# Patient Record
Sex: MALE | Race: BLACK OR AFRICAN AMERICAN | NOT HISPANIC OR LATINO | Employment: UNEMPLOYED | URBAN - METROPOLITAN AREA
[De-identification: names, ages, dates, MRNs, and addresses within clinical notes are randomized per-mention and may not be internally consistent; named-entity substitution may affect disease eponyms.]

---

## 2023-02-24 PROBLEM — H52.4 PRESBYOPIA: Status: ACTIVE | Noted: 2023-02-24

## 2023-02-24 PROBLEM — K62.5 RECTAL BLEEDING: Status: ACTIVE | Noted: 2023-02-24

## 2023-02-24 PROBLEM — R35.89 POLYURIA: Status: ACTIVE | Noted: 2023-02-24

## 2023-02-24 PROBLEM — H54.7 VISION LOSS: Status: ACTIVE | Noted: 2023-02-24

## 2023-02-24 PROBLEM — R29.6 RECURRENT FALLS: Status: ACTIVE | Noted: 2023-02-24

## 2023-02-24 PROBLEM — F10.10 ALCOHOL ABUSE: Status: ACTIVE | Noted: 2023-02-24

## 2023-02-24 PROBLEM — M75.80 ROTATOR CUFF TENDONITIS: Status: ACTIVE | Noted: 2023-02-24

## 2023-02-24 PROBLEM — I10 BENIGN ESSENTIAL HTN: Status: ACTIVE | Noted: 2023-02-24

## 2023-02-24 PROBLEM — Q84.5 ENLARGED AND HYPERTROPHIC NAILS: Status: ACTIVE | Noted: 2023-02-24

## 2023-02-24 PROBLEM — R74.01 TRANSAMINITIS: Status: ACTIVE | Noted: 2023-02-24

## 2023-02-24 PROBLEM — F10.20 ALCOHOLISM (MULTI): Status: ACTIVE | Noted: 2023-02-24

## 2023-02-24 PROBLEM — F12.90 MARIJUANA SMOKER: Status: ACTIVE | Noted: 2023-02-24

## 2023-02-24 PROBLEM — R68.89 COLD INTOLERANCE: Status: ACTIVE | Noted: 2023-02-24

## 2023-02-24 PROBLEM — M25.551 RIGHT HIP PAIN: Status: ACTIVE | Noted: 2023-02-24

## 2023-02-24 PROBLEM — M25.311 ROTATOR CUFF INSUFFICIENCY OF RIGHT SHOULDER: Status: ACTIVE | Noted: 2023-02-24

## 2023-02-24 PROBLEM — E55.9 VITAMIN D DEFICIENCY: Status: ACTIVE | Noted: 2023-02-24

## 2023-02-24 PROBLEM — F14.90 CRACK COCAINE USE: Status: ACTIVE | Noted: 2023-02-24

## 2023-02-24 RX ORDER — AMLODIPINE BESYLATE 5 MG/1
1 TABLET ORAL DAILY
COMMUNITY
Start: 2020-03-17 | End: 2023-07-10 | Stop reason: SDUPTHER

## 2023-02-24 RX ORDER — NAPROXEN 500 MG/1
500 TABLET ORAL EVERY 12 HOURS
COMMUNITY
Start: 2020-03-17

## 2023-02-24 RX ORDER — CHOLECALCIFEROL (VITAMIN D3) 25 MCG
25 TABLET ORAL DAILY
COMMUNITY
End: 2024-03-26 | Stop reason: SDUPTHER

## 2023-02-24 RX ORDER — ASPIRIN 81 MG/1
1 TABLET ORAL DAILY
COMMUNITY
Start: 2019-04-09

## 2023-02-24 RX ORDER — LIDOCAINE 50 MG/G
1 PATCH TOPICAL DAILY
COMMUNITY
Start: 2021-12-10

## 2023-02-24 RX ORDER — LANOLIN ALCOHOL/MO/W.PET/CERES
100 CREAM (GRAM) TOPICAL DAILY
COMMUNITY
End: 2024-03-26 | Stop reason: SDUPTHER

## 2023-03-07 ENCOUNTER — APPOINTMENT (OUTPATIENT)
Dept: PRIMARY CARE | Facility: CLINIC | Age: 53
End: 2023-03-07
Payer: COMMERCIAL

## 2023-07-06 ENCOUNTER — PATIENT OUTREACH (OUTPATIENT)
Dept: CARE COORDINATION | Facility: CLINIC | Age: 53
End: 2023-07-06
Payer: COMMERCIAL

## 2023-07-10 DIAGNOSIS — I10 BENIGN ESSENTIAL HTN: Primary | ICD-10-CM

## 2023-07-10 RX ORDER — AMLODIPINE BESYLATE 5 MG/1
5 TABLET ORAL DAILY
Qty: 90 TABLET | Refills: 0 | Status: SHIPPED | OUTPATIENT
Start: 2023-07-10 | End: 2023-10-06

## 2023-12-22 ENCOUNTER — APPOINTMENT (OUTPATIENT)
Dept: PRIMARY CARE | Facility: CLINIC | Age: 53
End: 2023-12-22
Payer: COMMERCIAL

## 2024-02-09 ENCOUNTER — APPOINTMENT (OUTPATIENT)
Dept: PRIMARY CARE | Facility: CLINIC | Age: 54
End: 2024-02-09
Payer: COMMERCIAL

## 2024-03-26 ENCOUNTER — OFFICE VISIT (OUTPATIENT)
Dept: PRIMARY CARE | Facility: CLINIC | Age: 54
End: 2024-03-26
Payer: COMMERCIAL

## 2024-03-26 ENCOUNTER — APPOINTMENT (OUTPATIENT)
Dept: LAB | Facility: LAB | Age: 54
End: 2024-03-26
Payer: COMMERCIAL

## 2024-03-26 VITALS
WEIGHT: 147 LBS | DIASTOLIC BLOOD PRESSURE: 85 MMHG | HEIGHT: 67 IN | TEMPERATURE: 97.6 F | OXYGEN SATURATION: 98 % | BODY MASS INDEX: 23.07 KG/M2 | HEART RATE: 76 BPM | SYSTOLIC BLOOD PRESSURE: 124 MMHG

## 2024-03-26 DIAGNOSIS — M12.811 ROTATOR CUFF ARTHROPATHY, RIGHT: ICD-10-CM

## 2024-03-26 DIAGNOSIS — R04.0 EPISTAXIS: ICD-10-CM

## 2024-03-26 DIAGNOSIS — F17.210 TOBACCO DEPENDENCE DUE TO CIGARETTES: ICD-10-CM

## 2024-03-26 DIAGNOSIS — Z01.118: ICD-10-CM

## 2024-03-26 DIAGNOSIS — F10.90 ALCOHOL USE DISORDER: ICD-10-CM

## 2024-03-26 DIAGNOSIS — I10 BENIGN ESSENTIAL HTN: Primary | ICD-10-CM

## 2024-03-26 PROCEDURE — 3079F DIAST BP 80-89 MM HG: CPT | Performed by: STUDENT IN AN ORGANIZED HEALTH CARE EDUCATION/TRAINING PROGRAM

## 2024-03-26 PROCEDURE — 3074F SYST BP LT 130 MM HG: CPT | Performed by: STUDENT IN AN ORGANIZED HEALTH CARE EDUCATION/TRAINING PROGRAM

## 2024-03-26 PROCEDURE — 99214 OFFICE O/P EST MOD 30 MIN: CPT | Performed by: STUDENT IN AN ORGANIZED HEALTH CARE EDUCATION/TRAINING PROGRAM

## 2024-03-26 PROCEDURE — 99406 BEHAV CHNG SMOKING 3-10 MIN: CPT | Performed by: STUDENT IN AN ORGANIZED HEALTH CARE EDUCATION/TRAINING PROGRAM

## 2024-03-26 RX ORDER — ASPIRIN 81 MG/1
81 TABLET ORAL DAILY
Qty: 90 TABLET | Refills: 0 | Status: CANCELLED | OUTPATIENT
Start: 2024-03-26

## 2024-03-26 RX ORDER — CHOLECALCIFEROL (VITAMIN D3) 25 MCG
1000 TABLET ORAL DAILY
Qty: 90 TABLET | Refills: 0 | Status: SHIPPED | OUTPATIENT
Start: 2024-03-26

## 2024-03-26 RX ORDER — NALTREXONE HYDROCHLORIDE 50 MG/1
50 TABLET, FILM COATED ORAL DAILY
Qty: 30 TABLET | Refills: 11 | Status: SHIPPED | OUTPATIENT
Start: 2024-03-26 | End: 2025-03-26

## 2024-03-26 RX ORDER — LANOLIN ALCOHOL/MO/W.PET/CERES
100 CREAM (GRAM) TOPICAL DAILY
Qty: 90 TABLET | Refills: 0 | Status: SHIPPED | OUTPATIENT
Start: 2024-03-26

## 2024-03-26 RX ORDER — AMLODIPINE BESYLATE 5 MG/1
5 TABLET ORAL DAILY
Qty: 90 TABLET | Refills: 0 | Status: SHIPPED | OUTPATIENT
Start: 2024-03-26

## 2024-03-26 ASSESSMENT — ENCOUNTER SYMPTOMS
UNEXPECTED WEIGHT CHANGE: 0
NUMBNESS: 1
WHEEZING: 0
WEAKNESS: 1
PSYCHIATRIC NEGATIVE: 1
FEVER: 0
ANAL BLEEDING: 0
TROUBLE SWALLOWING: 0
COUGH: 0
PALPITATIONS: 0
NAUSEA: 0
MYALGIAS: 1
CHILLS: 1
DIARRHEA: 0
CHEST TIGHTNESS: 0
DEPRESSION: 0
ABDOMINAL PAIN: 0
VOMITING: 0
EYE PAIN: 0
NECK PAIN: 1
SHORTNESS OF BREATH: 0
FATIGUE: 0
LOSS OF SENSATION IN FEET: 0
CONSTIPATION: 0
DIAPHORESIS: 0
OCCASIONAL FEELINGS OF UNSTEADINESS: 0

## 2024-03-26 ASSESSMENT — PATIENT HEALTH QUESTIONNAIRE - PHQ9
SUM OF ALL RESPONSES TO PHQ9 QUESTIONS 1 AND 2: 0
1. LITTLE INTEREST OR PLEASURE IN DOING THINGS: NOT AT ALL
2. FEELING DOWN, DEPRESSED OR HOPELESS: NOT AT ALL

## 2024-03-26 ASSESSMENT — PAIN SCALES - GENERAL: PAINLEVEL: 6

## 2024-03-26 NOTE — PROGRESS NOTES
Subjective   Patient ID: Cheko Kimble is a 54 y.o. male with a PMH of HTN, polysubstance abuse (alcohol, crack cocaine), and recurrent falls who presents for med refills and follow up   HPI    HTN   - 124/85  - ran out of medication (amlodipine 5 mg) a couple of months ago, but found two bottles in his med stash and is now taking them  - denies CP, SOB, headache, abdominal pain    R. Shoulder pain   - pain shoulder at night when laying on it   - endorses numbness and burning extending from the shoulder down into the fingers of the right hand   - started some months ago   - denies trauma   - does not believe his shoulder pain is related to fall in 06/2023  - remodels homes daily, involves painting and framing   - has not tried PT     Alcohol use disorder   - drinks three 24 oz drinks per day   - per patient this is a decrease from previous alcohol amounts   - hx of frequent falls   - patient wants to cut back but is experiencing difficulty     Epstaxis   - endorses daily nose bleeds  - patient with hx of cocaine use; states that he uses cocaine 3-4x yearly   - nose bleeds occur randomly       Review of Systems  As noted above   Objective   Physical Exam  Constitutional:       General: He is not in acute distress.  HENT:      Head: Normocephalic and atraumatic.      Right Ear: Tympanic membrane normal. There is no impacted cerumen.      Left Ear: Tympanic membrane normal. There is no impacted cerumen.      Mouth/Throat:      Mouth: Mucous membranes are moist.   Eyes:      Extraocular Movements: Extraocular movements intact.      Pupils: Pupils are equal, round, and reactive to light.   Cardiovascular:      Rate and Rhythm: Normal rate and regular rhythm.   Pulmonary:      Effort: Pulmonary effort is normal. No respiratory distress.   Abdominal:      General: Bowel sounds are normal. There is no distension.      Palpations: Abdomen is soft.      Tenderness: There is no abdominal tenderness. There is no guarding.  "  Musculoskeletal:         General: Normal range of motion.   Skin:     General: Skin is warm.      Findings: No erythema or lesion.   Neurological:      General: No focal deficit present.      Mental Status: He is alert.      Motor: No weakness.     /85 (BP Location: Right arm, Patient Position: Sitting)   Pulse 76   Temp 36.4 °C (97.6 °F)   Ht 1.702 m (5' 7\")   Wt 66.7 kg (147 lb)   SpO2 98%   BMI 23.02 kg/m²     Assessment/Plan   Problem List Items Addressed This Visit       Benign essential HTN - Primary     - bp at goal   - c/w amlodipine 5 mg \  - CMP pending          Relevant Medications    amLODIPine (Norvasc) 5 mg tablet    Other Relevant Orders    Comprehensive Metabolic Panel    Rotator cuff arthropathy, right     - shoulder pain with rotator cuff arthropathy with radiculopathy  - symptoms most likely secondary to overuse   - PT ordered         Relevant Orders    Referral to Physical Therapy    Alcohol use disorder     - hx of multiple falls due to alcohol use  - new complaint of hearing loss possibly secondary to alcohol use (alcohol is ototoxic)  - patient has tried successfully to decrease alcohol use but still drinks > 14 drinks per week (~42 drinks per week)   - naltrexone ordered; will start with 50 mg per day will increase to 100 mg daily after 1 week if alcohol consumption not decreased   - c/w thiamine and folic acid          Relevant Medications    thiamine 100 mg tablet    cholecalciferol (Vitamin D3) 25 MCG (1000 UT) tablet    naltrexone (Depade) 50 mg tablet    folic acid (Folvite) 1 mg tablet    Epistaxis     - possibly due to cocaine use; unsure if 3-4x per year use history is accurate   - cocaine cessation encouraged   - patient to moisturize nose daily with small amount of Vaseline   - can consider nasal saline if not improved          Tobacco dependence due to cigarettes    Relevant Medications    nicotine (Nicoderm CQ) 21 mg/24 hr patch    nicotine (Nicoderm CQ) 14 mg/24 hr " patch    nicotine (Nicoderm CQ) 7 mg/24 hr patch     Other Visit Diagnoses       Encntr for exam of ears and hearing w oth abnormal findings        Relevant Orders    Referral to ENT          RTC in 1 month for follow up of alcohol use     Seen and discussed with Dr. Monique Pond  Family Medicine, PGY-3

## 2024-03-26 NOTE — PROGRESS NOTES
Patient ID: Cheko Kimble is a 54 y.o. male with PMH of HTN, alcohol use disorder, hemorrhoids, Vitamin D deficiency and fall with rib fractures who presents for Follow-up, Med Refill, and R. Shoulder pain with paraesthesias.    Subjective   Mr. Kimble is a 53 y/o male presenting for a follow-up, Med Refill, and R. shoulder pain with paraesthesias.    HPI  R. 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  - Not related to his fall with multiple rib fxs in 06/2023  - Worse when laying down and sleeping, difficult to initiate movement upon waking  - Associated w/bilateral neck pain  - Reports paresthesias including numbness and burning of his shoulder, and tingling of his fingers on the affected side  - Has not tried PT  HTN  - Patient ran out of medication (amlodipine 5 mg) a couple of months ago, but found two bottles in his med stash and is now taking  - /85 today on medication  - Pertinent positives: epistaxis nearly everyday, picks at nose often  - Pertinent negatives: denies headaches, vision changes/disturbances, fatigue, tachycardia, palpitations, chest pain  - Has a blood pressure cuff at home, does not measure BP often  Hemorrhoids  - Well controlled on preparation H, no recent bleeding or melena reported  Alcohol Use Disorder  - Patient drinks at least 3 24 oz cans of beer daily  - Audit-C score = 8  - Drinking less than before and interested in cutting back  - Endorses bilateral hearing loss, frequent falls when inebriated with a hx of fxs w/ falls  - No scleral icterus or hepatosplenomegaly  - No hx of elevated liver enzymes  Tobacco Use  - Smokes 1/2 PPD for the past 30 years  - Nicotine patch helped reduce the amount he smoked in the past, not currently using patches  - Interested in cutting back  Crack-Cocaine Use  - Reports using  crack-cocaine 3-4x per year with friends  - Patients spouse also uses  Med Refill  - Amlodipine 5mg for HTN  - Thiamine and Vit. D3 for AUD    Review of Systems   Constitutional:  Positive for chills. Negative for diaphoresis, fatigue, fever and unexpected weight change.   HENT:  Positive for hearing loss and nosebleeds. Negative for congestion and trouble swallowing.         Bilateral hearing loss for the past year, nosebleeds nearly everyday, improved after restarting BP medication   Eyes:  Negative for pain and visual disturbance.   Respiratory:  Negative for cough, chest tightness, shortness of breath and wheezing.    Cardiovascular:  Negative for chest pain, palpitations and leg swelling.   Gastrointestinal:  Negative for abdominal pain, anal bleeding, constipation, diarrhea, nausea and vomiting.   Genitourinary: Negative.    Musculoskeletal:  Positive for myalgias and neck pain.        R. Shoulder and bilateral neck pain   Skin: Negative.    Neurological:  Positive for weakness and numbness.        Paresthesias and weakness in R. Shoulder and arm   Psychiatric/Behavioral: Negative.         Past Medical History:   Diagnosis Date    Other specified health status     No pertinent past surgical history    Unspecified injury of external genitals, initial encounter     Testicular injury    Urinary tract infection, site not specified     UTI (lower urinary tract infection)     No past surgical history on file.  Family History   Problem Relation Name Age of Onset    Cancer Mother      Other (Cardiac Abnormality) Other Aunt     Cancer Other Aunt     Diabetes Other Grandmother     Cancer Other Grandmother     Cancer Other Grandfather      Patient has no known allergies.   Social History     Tobacco Use    Smoking status: Every Day     Types: Cigarettes    Smokeless tobacco: Never   Substance Use Topics    Alcohol use: Yes       Current Outpatient Medications on File Prior to Visit   Medication Sig Dispense Refill     "aspirin 81 mg EC tablet Take 1 tablet (81 mg) by mouth once daily. Delayed Release      diclofenac sodium 1 % kit Apply topically once daily. APPLY SPARINGLY TO AFFECTED AREA(S)      lidocaine (Lidoderm) 5 % patch 1 patch once daily. APPLY 1 PATCH TO THE AFFECTED AREA AND LEAVE IN PLACE FOR 12 HOURS, THEN REMOVE AND LEAVE OFF FOR 12 HOURS.      naproxen (Naprosyn) 500 mg tablet Take 1 tablet (500 mg) by mouth every 12 hours. Take with food.      NON FORMULARY 1 each once daily. VITAMIN D 50 MCG (2000 UT) Oral Capsule      [DISCONTINUED] amLODIPine (Norvasc) 5 mg tablet TAKE 1 TABLET BY MOUTH EVERY DAY 90 tablet 0    [DISCONTINUED] cholecalciferol (Vitamin D3) 25 MCG (1000 UT) tablet Take 1 tablet (25 mcg) by mouth once daily.      [DISCONTINUED] thiamine 100 mg tablet Take 1 tablet (100 mg) by mouth once daily.       No current facility-administered medications on file prior to visit.        Objective   Vitals: /85 (BP Location: Right arm, Patient Position: Sitting)   Pulse 76   Temp 36.4 °C (97.6 °F)   Ht 1.702 m (5' 7\")   Wt 66.7 kg (147 lb)   SpO2 98%   BMI 23.02 kg/m²      Physical Exam  Constitutional:       General: He is not in acute distress.     Appearance: Normal appearance. He is normal weight. He is not ill-appearing or toxic-appearing.   HENT:      Head: Normocephalic and atraumatic.      Right Ear: Decreased hearing noted. There is no impacted cerumen. Tympanic membrane is not bulging.      Left Ear: Decreased hearing noted. There is no impacted cerumen.      Ears:        Comments: Dark spots/lines noted posterior to the TM membranes bilaterally, mild erythema noted in the R. Ear canal     Nose:      Comments: Nasal mucosa moist with moderate erythema bilaterally     Mouth/Throat:      Mouth: Mucous membranes are moist.   Eyes:      General: No scleral icterus.     Extraocular Movements: Extraocular movements intact.      Conjunctiva/sclera: Conjunctivae normal.      Pupils: Pupils are " equal, round, and reactive to light.   Neck:      Comments: Tenderness in the R. Posterior neck, pain with L. Lateral flexion, ROM reduced d/t pain radiating down to shoulder  Cardiovascular:      Rate and Rhythm: Normal rate and regular rhythm.      Pulses: Normal pulses.      Heart sounds: Normal heart sounds.   Pulmonary:      Effort: Pulmonary effort is normal.      Breath sounds: Normal breath sounds. No wheezing, rhonchi or rales.   Abdominal:      General: Abdomen is flat. Bowel sounds are normal. There is no distension.      Palpations: Abdomen is soft.      Tenderness: There is no abdominal tenderness. There is no guarding.   Musculoskeletal:      Right shoulder: Tenderness present. Decreased range of motion. Decreased strength.      Cervical back: Tenderness present.      Comments: Significantly decreased ROM and strength in the R. shoulder compared to the left, tenderness to light palpation of the posterior shoulder. Unable to bring arm overheard. Pain with motion in all planes.   Skin:     General: Skin is warm.      Findings: No lesion or rash.   Neurological:      General: No focal deficit present.      Mental Status: He is alert and oriented to person, place, and time.   Psychiatric:         Mood and Affect: Mood normal.         Behavior: Behavior normal.         Thought Content: Thought content normal.       Assessment/Plan   Cheko Kimble is a 54 y.o. male with PMH of HTN, alcohol use disorder, hemorrhoids, Vitamin D deficiency and fall with rib fractures who presents for Follow-up, Med Refill, and R. Shoulder pain with paraesthesias.    #R. Shoulder pain w/ decreased ROM  - No history of trauma, likely rotator cuff arthropathy due to overuse injury  - Patient referred to physical therapy for further management and counseling  #HTN  - This is a chronic problem  - Well controlled on amlodipine 5 mg  - Refill ordered  #AUD  - Excessive alcohol use, Audit-C = 8  - CMP ordered to assess for liver  damage  - Naltrexone prescribed to reduce cravings and assist with cessation  - Counseling provided regarding harmful effects of alcohol use including permanent hearing loss, increased risk of falls/fractures, and possible liver damage  -  ENT referral to evaluate bilateral hearing loss and exam findings  - Thiamine and Vit D3 and thiamine refills ordered      Problem List Items Addressed This Visit       Benign essential HTN - Primary    Relevant Medications    amLODIPine (Norvasc) 5 mg tablet    Other Relevant Orders    Comprehensive Metabolic Panel     Other Visit Diagnoses       Encntr for exam of ears and hearing w oth abnormal findings        Relevant Orders    Referral to ENT    Alcohol use disorder        Relevant Medications    thiamine 100 mg tablet    cholecalciferol (Vitamin D3) 25 MCG (1000 UT) tablet    naltrexone (Depade) 50 mg tablet    Rotator cuff arthropathy, right        Relevant Orders    Referral to Physical Therapy            Attending Supervision: seen and discussed with attending (cosigner listed on this note).    RTC in 2 months, or earlier as needed    Roni Bwoman, MS3  Family Medicine

## 2024-03-28 PROBLEM — M12.811 ROTATOR CUFF ARTHROPATHY, RIGHT: Status: ACTIVE | Noted: 2024-03-28

## 2024-03-28 PROBLEM — R04.0 EPISTAXIS: Status: ACTIVE | Noted: 2024-03-28

## 2024-03-28 PROBLEM — F10.20 ALCOHOLISM (MULTI): Status: RESOLVED | Noted: 2023-02-24 | Resolved: 2024-03-28

## 2024-03-28 PROBLEM — F10.90 ALCOHOL USE DISORDER: Status: ACTIVE | Noted: 2024-03-28

## 2024-03-28 PROBLEM — F17.210 TOBACCO DEPENDENCE DUE TO CIGARETTES: Status: ACTIVE | Noted: 2024-03-28

## 2024-03-28 PROBLEM — F10.10 ALCOHOL ABUSE: Status: RESOLVED | Noted: 2023-02-24 | Resolved: 2024-03-28

## 2024-03-28 RX ORDER — IBUPROFEN 200 MG
1 TABLET ORAL DAILY
Qty: 42 PATCH | Refills: 0 | Status: SHIPPED | OUTPATIENT
Start: 2024-03-28 | End: 2024-05-09

## 2024-03-28 RX ORDER — IBUPROFEN 200 MG
1 TABLET ORAL EVERY 24 HOURS
Qty: 14 PATCH | Refills: 0 | Status: SHIPPED | OUTPATIENT
Start: 2024-03-28 | End: 2024-04-11

## 2024-03-28 RX ORDER — FOLIC ACID 1 MG/1
1 TABLET ORAL DAILY
Qty: 30 TABLET | Refills: 11 | Status: SHIPPED | OUTPATIENT
Start: 2024-03-28 | End: 2025-03-28

## 2024-03-28 RX ORDER — NICOTINE 7MG/24HR
1 PATCH, TRANSDERMAL 24 HOURS TRANSDERMAL EVERY 24 HOURS
Qty: 14 PATCH | Refills: 0 | Status: SHIPPED | OUTPATIENT
Start: 2024-03-28 | End: 2024-04-11

## 2024-03-28 NOTE — ASSESSMENT & PLAN NOTE
- options for cessation offered   - has used nicotine patches in the past with some success   - nicotine patches ordered   - tobacco cessation counseling for 10 min

## 2024-03-28 NOTE — ASSESSMENT & PLAN NOTE
- possibly due to cocaine use; unsure if 3-4x per year use history is accurate   - cocaine cessation encouraged   - patient to moisturize nose daily with small amount of Vaseline   - can consider nasal saline if not improved

## 2024-03-28 NOTE — ASSESSMENT & PLAN NOTE
- hx of multiple falls due to alcohol use  - new complaint of hearing loss possibly secondary to alcohol use (alcohol is ototoxic)  - patient has tried successfully to decrease alcohol use but still drinks > 14 drinks per week (~42 drinks per week)   - naltrexone ordered; will start with 50 mg per day will increase to 100 mg daily after 1 week if alcohol consumption not decreased   - c/w thiamine and folic acid

## 2024-03-28 NOTE — ASSESSMENT & PLAN NOTE
- shoulder pain with rotator cuff arthropathy with radiculopathy  - symptoms most likely secondary to overuse   - PT ordered

## 2024-04-30 NOTE — PROGRESS NOTES
I saw and evaluated the patient. I personally obtained the key and critical portions of the history and physical exam or was physically present for key and critical portions performed by the resident/fellow. I reviewed the resident/fellow's documentation and discussed the patient with the resident/fellow. I agree with the resident/fellow's medical decision making as documented in the note.    Alejandro Amaya MD

## 2024-05-31 ENCOUNTER — APPOINTMENT (OUTPATIENT)
Dept: PAIN MEDICINE | Facility: CLINIC | Age: 54
End: 2024-05-31
Payer: COMMERCIAL

## 2024-06-14 ENCOUNTER — APPOINTMENT (OUTPATIENT)
Dept: PAIN MEDICINE | Facility: CLINIC | Age: 54
End: 2024-06-14
Payer: COMMERCIAL

## 2024-08-07 DIAGNOSIS — I10 BENIGN ESSENTIAL HTN: ICD-10-CM

## 2024-08-07 DIAGNOSIS — E78.5 HYPERLIPIDEMIA, UNSPECIFIED HYPERLIPIDEMIA TYPE: Primary | ICD-10-CM

## 2024-08-07 DIAGNOSIS — F10.90 ALCOHOL USE DISORDER: ICD-10-CM

## 2024-08-07 NOTE — TELEPHONE ENCOUNTER
Pt wife called requesting refills of pt amlodipine, Vitamin B and ASA 81mg.Orders pended and routed to provider.

## 2024-08-08 RX ORDER — AMLODIPINE BESYLATE 5 MG/1
5 TABLET ORAL DAILY
Qty: 30 TABLET | Refills: 1 | Status: SHIPPED | OUTPATIENT
Start: 2024-08-08

## 2024-08-08 RX ORDER — LANOLIN ALCOHOL/MO/W.PET/CERES
100 CREAM (GRAM) TOPICAL DAILY
Qty: 30 TABLET | Refills: 1 | Status: SHIPPED | OUTPATIENT
Start: 2024-08-08

## 2024-08-08 RX ORDER — ASPIRIN 81 MG/1
81 TABLET ORAL DAILY
Qty: 30 TABLET | Refills: 1 | Status: SHIPPED | OUTPATIENT
Start: 2024-08-08

## 2024-08-21 ENCOUNTER — APPOINTMENT (OUTPATIENT)
Dept: OTOLARYNGOLOGY | Facility: HOSPITAL | Age: 54
End: 2024-08-21
Payer: COMMERCIAL

## 2024-09-05 DIAGNOSIS — E78.5 HYPERLIPIDEMIA, UNSPECIFIED HYPERLIPIDEMIA TYPE: ICD-10-CM

## 2024-09-05 DIAGNOSIS — F10.90 ALCOHOL USE DISORDER: ICD-10-CM

## 2024-09-06 RX ORDER — LANOLIN ALCOHOL/MO/W.PET/CERES
100 CREAM (GRAM) TOPICAL DAILY
Qty: 90 TABLET | Refills: 1 | Status: SHIPPED | OUTPATIENT
Start: 2024-09-06

## 2024-09-06 RX ORDER — ASPIRIN 81 MG/1
81 TABLET ORAL DAILY
Qty: 90 TABLET | Refills: 1 | Status: SHIPPED | OUTPATIENT
Start: 2024-09-06

## 2024-10-01 ENCOUNTER — APPOINTMENT (OUTPATIENT)
Dept: PRIMARY CARE | Facility: CLINIC | Age: 54
End: 2024-10-01
Payer: COMMERCIAL

## 2024-11-05 ENCOUNTER — OFFICE VISIT (OUTPATIENT)
Dept: ORTHOPEDIC SURGERY | Facility: HOSPITAL | Age: 54
End: 2024-11-05
Payer: COMMERCIAL

## 2024-11-05 ENCOUNTER — HOSPITAL ENCOUNTER (OUTPATIENT)
Dept: RADIOLOGY | Facility: HOSPITAL | Age: 54
Discharge: HOME | End: 2024-11-05
Payer: COMMERCIAL

## 2024-11-05 DIAGNOSIS — S90.852A: ICD-10-CM

## 2024-11-05 DIAGNOSIS — M79.673 PAIN OF FOOT, UNSPECIFIED LATERALITY: ICD-10-CM

## 2024-11-05 PROCEDURE — 99203 OFFICE O/P NEW LOW 30 MIN: CPT | Performed by: ORTHOPAEDIC SURGERY

## 2024-11-05 PROCEDURE — 73630 X-RAY EXAM OF FOOT: CPT | Mod: RT

## 2024-11-05 PROCEDURE — 99213 OFFICE O/P EST LOW 20 MIN: CPT | Performed by: ORTHOPAEDIC SURGERY

## 2024-11-05 NOTE — LETTER
November 5, 2024     Patient: Cheko Kimble   YOB: 1970   Date of Visit: 11/5/2024       To Whom It May Concern:    Cheko Kimble was seen in my clinic on 11/5/2024 at 10:50 am. Please excuse Cheko for his absence from work on this day to make the appointment.    If you have any questions or concerns, please don't hesitate to call.         Sincerely,         Eddy Schwartz MD        CC: No Recipients

## 2024-11-05 NOTE — PROGRESS NOTES
ORTHOPAEDIC HISTORY AND PHYSICAL    History Of Present Illness  Orthopaedic Problems/Injuries:  Cheko Kimble is a 54 y.o. male presenting with left foot pain.  Patient reported that about 2 months ago he was working on rebuilding some pallets and felt like a splinter entered his foot.  He is hide prominence and swelling over this area since.  He felt like there is something inside of his foot.  Whenever he soaks his foot he reports that the callus becomes soft and he is able to see something but unable to get it removed.  He has pain weightbearing.  He has no erythema or drainage from this area.  Patient is not diabetic.    Review of Systems: 12 point ROS negative unless stated in HPI    Past Medical History  He has a past medical history of Other specified health status, Unspecified injury of external genitals, initial encounter, and Urinary tract infection, site not specified.    Surgical History  He has no past surgical history on file.     Social History  He reports that he has been smoking cigarettes. He has never used smokeless tobacco. He reports current alcohol use. He reports current drug use. Drug: Marijuana.    Family History  Family History   Problem Relation Name Age of Onset    Cancer Mother      Other (Cardiac Abnormality) Other Aunt     Cancer Other Aunt     Diabetes Other Grandmother     Cancer Other Grandmother     Cancer Other Grandfather         Allergies  Patient has no known allergies.    Review of Systems     Physical Exam  Gen: The patient is alert and oriented ×3, is in no acute distress, and appear their stated age and weight.    Psychiatric: Mood and affect are appropriate.    Eyes: Sclera are white, and pupils are round and symmetric.    ENT: Mucous membranes are moist.     Neck: Supple. Thyroid is midline.    Respiratory: Respirations are nonlabored, chest rise is symmetric.    Cardiac: Rate is regular by palpation of distal pulses.     Abdomen: Nondistended.    Integument: No obvious  cutaneous lesions are noted. No signs of lymphangitis. No signs of systemic edema.,l    Evaluation of the left foot reveals some callus over the plantar aspect of the foot.  There is some swelling and tenderness.  The scab was removed and no foreign body was appreciated.  However he had area of induration.  No other area of open wound.  No sores.  Normal sensation in the foot and the foot is well-perfused.        Relevant Results     I personally reviewed left foot radiograph that did not reveal any foreign body.  However there is area of prominence swelling that corresponding to the area of the plantar aspect of the foot.  Assessment/Plan   Patient presents today with left foot foreign body with soft tissue reaction.  I could not visualize any foreign body in the foot.  But based on clinical exam he was unable to tolerate any further exploration.  I discussed with him that it is possible that he has radiolucent bodies such as a fragment of wood in the foot at the body is trying to exclude.  He was unable to tolerate an exploration today.  I referred him to podiatry for evaluation for possible exploration or removal.  He may continue to soak his foot and see if he can get what ever is in the foot removed.

## 2024-11-19 NOTE — PROGRESS NOTES
Chief Complaint   Patient presents with    Right Shoulder - Pain     Patient had no injury or fall. Pain for 4 months. Xrays today. Just finished Mdpk and Mobic prescription from urgent care that seemed to help.      History of Present Illness:  Cheko Kimble is a 54 y.o. male presenting to clinic for his right shoulder. He was seen in the ED for this on 10/03/24. He denies any injury or fall in the shoulder. His pain has been ongoing for 4 months. He just finished oral steroids and mobic that did seem to help. He has not had any injections in this shoulder.     Imaging:  X-rays: Shows no acute fractures or dislocations. Shows severe arthritis.      Assessment:   Severe right shoulder arthritis.    Plan:  We reviewed the role of imaging, physical therapy, injections and the time frame to healing and correlation with outcome.  Right shoulder cortisone injection today.   NSAID: Mobic refill sent to pharmacy.  GI side effects and medical risks discussed  Ice: 60 minutes on and off  Exercise home program: Medically directed Shoulder therapy / Handout given  Follow-up in 6 weeks.   L Inj/Asp: R glenohumeral on 11/21/2024 1:51 PM  Indications: pain  Details: 22 G needle, posterior approach  Medications: 8 mL lidocaine 10 mg/mL (1 %); 40 mg triamcinolone acetonide 40 mg/mL  Outcome: tolerated well, no immediate complications  Procedure, treatment alternatives, risks and benefits explained, specific risks discussed. Consent was given by the patient. Immediately prior to procedure a time out was called to verify the correct patient, procedure, equipment, support staff and site/side marked as required. Patient was prepped and draped in the usual sterile fashion.          Physical Exam:  Well-nourished, well-developed. No acute distress. Alert and oriented x3. Responds appropriately to questioning. Good mood. Normal affect.  Right Shoulder:  Strength / ROM: Externally rotates to 30, abduction to 90, 5/5 rotator cuff strength    Speeds test + impingement  Positive Neer and Hawkin´s test  Neurovascular exam normal distally      Review of Systems:  GENERAL: Negative for malaise, significant weight loss, fever  MUSCULOSKELETAL: See HPI  NEURO:  Negative     Past Medical History:   Diagnosis Date    Other specified health status     No pertinent past surgical history    Unspecified injury of external genitals, initial encounter     Testicular injury    Urinary tract infection, site not specified     UTI (lower urinary tract infection)       Medication Documentation Review Audit       Reviewed by Jarrett Anguiano MA (Medical Assistant) on 03/26/24 at 0821      Medication Order Taking? Sig Documenting Provider Last Dose Status   amLODIPine (Norvasc) 5 mg tablet [id number] Yes TAKE 1 TABLET BY MOUTH EVERY DAY Quincy Kang MD Taking Active   aspirin 81 mg EC tablet [id number] Yes Take 1 tablet (81 mg) by mouth once daily. Delayed Release Nicho Hernandez MD Taking Active   cholecalciferol (Vitamin D3) 25 MCG (1000 UT) tablet [id number] Yes Take 1 tablet (25 mcg) by mouth once daily. Nicho Hernandez MD Taking Active   diclofenac sodium 1 % kit [id number] Yes Apply topically once daily. APPLY SPARINGLY TO AFFECTED AREA(S) Nicho Hernandez MD Taking Active   lidocaine (Lidoderm) 5 % patch [id number] Yes 1 patch once daily. APPLY 1 PATCH TO THE AFFECTED AREA AND LEAVE IN PLACE FOR 12 HOURS, THEN REMOVE AND LEAVE OFF FOR 12 HOURS. Nicho Hernandez MD Taking Active   naproxen (Naprosyn) 500 mg tablet [id number] Yes Take 1 tablet (500 mg) by mouth every 12 hours. Take with food. Nicho Hernandez MD Taking Active   NON FORMULARY [id number] Yes 1 each once daily. VITAMIN D 50 MCG (2000 UT) Oral Capsule Nicho Hernandez MD Taking Active   thiamine 100 mg tablet [id number] No Take 1 tablet (100 mg) by mouth once daily. Nicho Hernandez MD Unknown Active                    No Known Allergies    Social History     Socioeconomic History     Marital status:      Spouse name: Not on file    Number of children: Not on file    Years of education: Not on file    Highest education level: Not on file   Occupational History    Not on file   Tobacco Use    Smoking status: Every Day     Types: Cigarettes    Smokeless tobacco: Never   Substance and Sexual Activity    Alcohol use: Yes    Drug use: Yes     Types: Marijuana    Sexual activity: Not on file   Other Topics Concern    Not on file   Social History Narrative    Not on file     Social Drivers of Health     Financial Resource Strain: Not on file   Food Insecurity: Not on file   Transportation Needs: Not on file   Physical Activity: Not on file   Stress: Not on file   Social Connections: Not on file   Intimate Partner Violence: Not on file   Housing Stability: Not on file       No past surgical history on file.    XR foot right 3+ views    Result Date: 11/6/2024  Interpreted By:  Enrique Camarillo, STUDY: Right foot dated  11/5/2024.   INDICATION: Signs/Symptoms:s/p fracture   COMPARISON: None.   ACCESSION NUMBER(S): [id number]   ORDERING CLINICIAN: MARTY FROST   TECHNIQUE: Three views of the  right foot.   FINDINGS: No fracture or dislocation is evident.    No ankle joint effusion is evident. No soft tissue gas or radiopaque foreign body is evident.       No osseous injury or radiopaque foreign body is evident.   MACRO: None   Signed by: Enrique Camarillo 11/6/2024 12:41 PM Dictation workstation:   [id number]                             Scribe Attestation  By signing my name below, Radha ESCALONA Scribdina   attest that this documentation has been prepared under the direction and in the presence of Feliciano Ken MD.

## 2024-11-21 ENCOUNTER — OFFICE VISIT (OUTPATIENT)
Dept: ORTHOPEDIC SURGERY | Facility: CLINIC | Age: 54
End: 2024-11-21
Payer: COMMERCIAL

## 2024-11-21 ENCOUNTER — HOSPITAL ENCOUNTER (OUTPATIENT)
Dept: RADIOLOGY | Facility: CLINIC | Age: 54
Discharge: HOME | End: 2024-11-21
Payer: COMMERCIAL

## 2024-11-21 DIAGNOSIS — M25.511 RIGHT SHOULDER PAIN, UNSPECIFIED CHRONICITY: ICD-10-CM

## 2024-11-21 DIAGNOSIS — M19.011 PRIMARY OSTEOARTHRITIS OF RIGHT SHOULDER: Primary | ICD-10-CM

## 2024-11-21 PROCEDURE — 73030 X-RAY EXAM OF SHOULDER: CPT | Mod: RIGHT SIDE | Performed by: RADIOLOGY

## 2024-11-21 PROCEDURE — 2500000004 HC RX 250 GENERAL PHARMACY W/ HCPCS (ALT 636 FOR OP/ED): Performed by: ORTHOPAEDIC SURGERY

## 2024-11-21 PROCEDURE — 73030 X-RAY EXAM OF SHOULDER: CPT | Mod: RT

## 2024-11-21 PROCEDURE — 99204 OFFICE O/P NEW MOD 45 MIN: CPT | Performed by: ORTHOPAEDIC SURGERY

## 2024-11-21 PROCEDURE — 99214 OFFICE O/P EST MOD 30 MIN: CPT | Performed by: ORTHOPAEDIC SURGERY

## 2024-11-21 PROCEDURE — 20610 DRAIN/INJ JOINT/BURSA W/O US: CPT | Mod: RT | Performed by: ORTHOPAEDIC SURGERY

## 2024-11-21 RX ORDER — MELOXICAM 15 MG/1
15 TABLET ORAL DAILY
Qty: 30 TABLET | Refills: 2 | Status: SHIPPED | OUTPATIENT
Start: 2024-11-21 | End: 2025-11-21

## 2024-11-21 RX ORDER — LIDOCAINE HYDROCHLORIDE 10 MG/ML
8 INJECTION, SOLUTION INFILTRATION; PERINEURAL
Status: COMPLETED | OUTPATIENT
Start: 2024-11-21 | End: 2024-11-21

## 2024-11-21 RX ORDER — TRIAMCINOLONE ACETONIDE 40 MG/ML
40 INJECTION, SUSPENSION INTRA-ARTICULAR; INTRAMUSCULAR
Status: COMPLETED | OUTPATIENT
Start: 2024-11-21 | End: 2024-11-21

## 2024-11-21 NOTE — LETTER
November 21, 2024     Patient: Cheko Kimble   YOB: 1970   Date of Visit: 11/21/2024       To Whom It May Concern:     Cheko Kimble was seen in my office on 11/21/2024.    If you have any questions or concerns, please don't hesitate to call.         Sincerely,        Feliciano Ken MD

## 2024-12-17 ENCOUNTER — APPOINTMENT (OUTPATIENT)
Dept: PRIMARY CARE | Facility: CLINIC | Age: 54
End: 2024-12-17
Payer: COMMERCIAL

## 2025-01-09 ENCOUNTER — APPOINTMENT (OUTPATIENT)
Dept: ORTHOPEDIC SURGERY | Facility: CLINIC | Age: 55
End: 2025-01-09
Payer: COMMERCIAL

## 2025-02-18 ENCOUNTER — APPOINTMENT (OUTPATIENT)
Dept: PRIMARY CARE | Facility: CLINIC | Age: 55
End: 2025-02-18
Payer: COMMERCIAL

## 2025-02-18 VITALS
WEIGHT: 140 LBS | BODY MASS INDEX: 21.97 KG/M2 | OXYGEN SATURATION: 95 % | HEIGHT: 67 IN | HEART RATE: 92 BPM | DIASTOLIC BLOOD PRESSURE: 70 MMHG | SYSTOLIC BLOOD PRESSURE: 105 MMHG | TEMPERATURE: 98.3 F

## 2025-02-18 DIAGNOSIS — Z13.29 SCREENING FOR ENDOCRINE, METABOLIC AND IMMUNITY DISORDER: ICD-10-CM

## 2025-02-18 DIAGNOSIS — Z23 NEED FOR INFLUENZA VACCINATION: ICD-10-CM

## 2025-02-18 DIAGNOSIS — I10 BENIGN ESSENTIAL HTN: Primary | ICD-10-CM

## 2025-02-18 DIAGNOSIS — Z13.228 SCREENING FOR ENDOCRINE, METABOLIC AND IMMUNITY DISORDER: ICD-10-CM

## 2025-02-18 DIAGNOSIS — F10.90 ALCOHOL USE DISORDER: ICD-10-CM

## 2025-02-18 DIAGNOSIS — E55.9 VITAMIN D DEFICIENCY: ICD-10-CM

## 2025-02-18 DIAGNOSIS — Z13.0 SCREENING FOR ENDOCRINE, METABOLIC AND IMMUNITY DISORDER: ICD-10-CM

## 2025-02-18 DIAGNOSIS — E78.5 HYPERLIPIDEMIA, UNSPECIFIED HYPERLIPIDEMIA TYPE: ICD-10-CM

## 2025-02-18 PROCEDURE — 90471 IMMUNIZATION ADMIN: CPT | Performed by: INTERNAL MEDICINE

## 2025-02-18 PROCEDURE — 3008F BODY MASS INDEX DOCD: CPT | Performed by: INTERNAL MEDICINE

## 2025-02-18 PROCEDURE — 3078F DIAST BP <80 MM HG: CPT | Performed by: INTERNAL MEDICINE

## 2025-02-18 PROCEDURE — 3074F SYST BP LT 130 MM HG: CPT | Performed by: INTERNAL MEDICINE

## 2025-02-18 PROCEDURE — 90673 RIV3 VACCINE NO PRESERV IM: CPT | Performed by: INTERNAL MEDICINE

## 2025-02-18 PROCEDURE — 99203 OFFICE O/P NEW LOW 30 MIN: CPT | Performed by: INTERNAL MEDICINE

## 2025-02-18 RX ORDER — ASPIRIN 81 MG/1
81 TABLET ORAL DAILY
Qty: 90 TABLET | Refills: 1 | Status: SHIPPED | OUTPATIENT
Start: 2025-02-18

## 2025-02-18 RX ORDER — AMLODIPINE BESYLATE 5 MG/1
5 TABLET ORAL EVERY MORNING
Qty: 90 TABLET | Refills: 1 | Status: SHIPPED | OUTPATIENT
Start: 2025-02-18

## 2025-02-18 ASSESSMENT — PATIENT HEALTH QUESTIONNAIRE - PHQ9
2. FEELING DOWN, DEPRESSED OR HOPELESS: NOT AT ALL
1. LITTLE INTEREST OR PLEASURE IN DOING THINGS: NOT AT ALL
SUM OF ALL RESPONSES TO PHQ9 QUESTIONS 1 AND 2: 0

## 2025-02-18 ASSESSMENT — ENCOUNTER SYMPTOMS
SEIZURES: 0
FREQUENCY: 0
VOMITING: 0
JOINT SWELLING: 0
WOUND: 0
COUGH: 0
CONFUSION: 0
SORE THROAT: 0
CONSTIPATION: 0
DEPRESSION: 0
TROUBLE SWALLOWING: 0
DIARRHEA: 0
NERVOUS/ANXIOUS: 0
ARTHRALGIAS: 1
SLEEP DISTURBANCE: 0
FLANK PAIN: 0
OCCASIONAL FEELINGS OF UNSTEADINESS: 0
ABDOMINAL PAIN: 0
SHORTNESS OF BREATH: 0
DIZZINESS: 0
BLOOD IN STOOL: 0
WHEEZING: 0
UNEXPECTED WEIGHT CHANGE: 0
EYE DISCHARGE: 0
HEADACHES: 0
WEAKNESS: 0
EYE PAIN: 0
NAUSEA: 0
DYSURIA: 0
PALPITATIONS: 0
LOSS OF SENSATION IN FEET: 0
APPETITE CHANGE: 0
HEMATURIA: 0
FEVER: 0
BACK PAIN: 0
CHILLS: 0
NUMBNESS: 0
TREMORS: 0

## 2025-02-18 ASSESSMENT — COLUMBIA-SUICIDE SEVERITY RATING SCALE - C-SSRS
1. IN THE PAST MONTH, HAVE YOU WISHED YOU WERE DEAD OR WISHED YOU COULD GO TO SLEEP AND NOT WAKE UP?: NO
6. HAVE YOU EVER DONE ANYTHING, STARTED TO DO ANYTHING, OR PREPARED TO DO ANYTHING TO END YOUR LIFE?: NO
2. HAVE YOU ACTUALLY HAD ANY THOUGHTS OF KILLING YOURSELF?: NO

## 2025-02-18 NOTE — ASSESSMENT & PLAN NOTE
- almost daily beers and tequilla stating in weekends  - on B complex vitamines and folic acid  Orders:    TSH with reflex to Free T4 if abnormal; Future    Magnesium; Future

## 2025-02-18 NOTE — PROGRESS NOTES
"Subjective   Patient ID: Cheko Kimble is a 55 y.o. male who presents for New Patient Visit (New pt is here to establish care. Right hip pain. Refill on BP meds ).    HPI   NEW PT ( pcp [facility])  Here to establish.   Chart reviewed, PMHX, meds,  Social HX- updated at visit   Labs( no labs since 2023) and imaging reviewed     Recent recent ER visits or hospitalizations:  no  Specialists:  ORTHO- shoulder - steroid     Review of Systems   Constitutional:  Negative for appetite change, chills, fever and unexpected weight change.   HENT:  Negative for congestion, ear pain, sneezing, sore throat and trouble swallowing.    Eyes:  Negative for pain, discharge and visual disturbance.   Respiratory:  Negative for cough, shortness of breath and wheezing.    Cardiovascular:  Negative for chest pain, palpitations and leg swelling.   Gastrointestinal:  Negative for abdominal pain, blood in stool, constipation, diarrhea, nausea and vomiting.   Genitourinary:  Negative for dysuria, flank pain, frequency, hematuria and urgency.   Musculoskeletal:  Positive for arthralgias. Negative for back pain, gait problem and joint swelling.   Skin:  Negative for rash and wound.   Neurological:  Negative for dizziness, tremors, seizures, syncope, weakness, numbness and headaches.   Psychiatric/Behavioral:  Negative for confusion, sleep disturbance and suicidal ideas. The patient is not nervous/anxious.        Objective   /70   Pulse 92   Temp 36.8 °C (98.3 °F)   Ht 1.702 m (5' 7\")   Wt 63.5 kg (140 lb)   SpO2 95%   BMI 21.93 kg/m²   Patient Health Questionnaire-2 Score: 0      Physical Exam  Vitals and nursing note reviewed.   Constitutional:       General: He is not in acute distress.     Appearance: Normal appearance.   HENT:      Head: Normocephalic and atraumatic.      Nose: Nose normal.      Mouth/Throat:      Mouth: Mucous membranes are moist.      Dentition: Abnormal dentition (poor  dentition).      Pharynx: Oropharynx is " clear.   Eyes:      Extraocular Movements: Extraocular movements intact.      Conjunctiva/sclera: Conjunctivae normal.      Pupils: Pupils are equal, round, and reactive to light.   Cardiovascular:      Rate and Rhythm: Normal rate and regular rhythm.      Heart sounds: No murmur heard.  Pulmonary:      Effort: Pulmonary effort is normal. No respiratory distress.      Breath sounds: Normal breath sounds. No wheezing or rhonchi.   Abdominal:      General: Bowel sounds are normal.      Palpations: Abdomen is soft.      Tenderness: There is no abdominal tenderness.   Musculoskeletal:         General: Normal range of motion.      Cervical back: Neck supple.      Right lower leg: No edema.      Left lower leg: No edema.   Skin:     General: Skin is warm and dry.      Findings: No bruising or rash.   Neurological:      General: No focal deficit present.      Mental Status: He is alert and oriented to person, place, and time.      Motor: No weakness.      Gait: Gait normal.   Psychiatric:         Mood and Affect: Mood normal.         Behavior: Behavior normal.         Assessment/Plan   Assessment & Plan  Benign essential HTN  - bp CONTROLLED on CA Blocker -refilled today  - denies h/o MI/stroke   Orders:    amLODIPine (Norvasc) 5 mg tablet; Take 1 tablet (5 mg) by mouth once daily in the morning.    Comprehensive Metabolic Panel; Future    Alcohol use disorder  - almost daily beers and tequilla stating in weekends  - on B complex vitamines and folic acid  Orders:    TSH with reflex to Free T4 if abnormal; Future    Magnesium; Future    Hyperlipidemia, unspecified hyperlipidemia type  - chronic, not on meds   Orders:    aspirin 81 mg EC tablet; Take 1 tablet (81 mg) by mouth once daily. Delayed Release    Lipid Panel; Future    Vitamin D deficiency  - chronic, taking daily vit D 1000 IU   Orders:    Vitamin D 25-Hydroxy,Total (for eval of Vitamin D levels); Future    Screening for endocrine, metabolic and immunity  disorder    Orders:    Hemoglobin A1C; Future    Need for influenza vaccination    Orders:    Flu vaccine, trivalent, preservative free, no egg protein, age 18y+ (Flublok)      RTC 6 months

## 2025-02-18 NOTE — LETTER
February 18, 2025     Patient: Cheko Kimble   YOB: 1970   Date of Visit: 2/18/2025       To Whom It May Concern:    Cheko Kimble was seen in my clinic on 2/18/2025 at 2:00 pm. Please excuse Cheko for his absence from work on this day to make the appointment.    If you have any questions or concerns, please don't hesitate to call.         Sincerely,         Brittany Dodson MD        CC: No Recipients

## 2025-02-18 NOTE — PROGRESS NOTES
Chief Complaint   Patient presents with    Right Shoulder - Follow-up     Primary OA, s/p CSI 11/21/2024     History of Present Illness:  2/20/25: We gave him a cortisone injection into the right shoulder on 11/21/24. He got about 3 months of relief with the cortisone injection and would like to repeat that today. He does have a new complaint of right hip pain.     11/21/24: Cheko Kimble is a 55 y.o. male presenting to clinic for his right shoulder. He was seen in the ED for this on 10/03/24. He denies any injury or fall in the shoulder. His pain has been ongoing for 4 months. He just finished oral steroids and mobic that did seem to help. He has not had any injections in this shoulder.     Imaging:  X-rays right shoulder: Shows no acute fractures or dislocations. Shows severe arthritis.   X-rays right hip: Shows severe right hip arthritis      Assessment:   Severe right shoulder arthritis  Severe right hip arthritis    Plan:  We reviewed the role of imaging, physical therapy, injections and the time frame to healing and correlation with outcome.  Right shoulder cortisone injection today.   NSAID: Mobic. GI side effects and medical risks discussed  Ice: 60 minutes on and off  Exercise home program: Medically directed Shoulder therapy / Handout given  Referral to Dr. Herrera to establish care for potential hip replacement.   Follow-up as needed for repeat injections in the shoulder.     L Inj/Asp: R glenohumeral on 2/20/2025 2:30 PM  Indications: pain  Details: 22 G needle, posterior approach  Medications: 8 mL lidocaine 10 mg/mL (1 %); 40 mg triamcinolone acetonide 40 mg/mL  Outcome: tolerated well, no immediate complications  Procedure, treatment alternatives, risks and benefits explained, specific risks discussed. Consent was given by the patient. Immediately prior to procedure a time out was called to verify the correct patient, procedure, equipment, support staff and site/side marked as required. Patient was  prepped and draped in the usual sterile fashion.          Physical Exam:  Well-nourished, well-developed. No acute distress. Alert and oriented x3. Responds appropriately to questioning. Good mood. Normal affect.  Right Shoulder:  Strength / ROM: Externally rotates to 30, abduction to 90, 5/5 rotator cuff strength   Speeds test + impingement  Positive Neer and Hawkin´s test  Neurovascular exam normal distally      Review of Systems:  GENERAL: Negative for malaise, significant weight loss, fever  MUSCULOSKELETAL: See HPI  NEURO:  Negative     Past Medical History:   Diagnosis Date    Other specified health status     No pertinent past surgical history    Unspecified injury of external genitals, initial encounter     Testicular injury    Urinary tract infection, site not specified     UTI (lower urinary tract infection)       Medication Documentation Review Audit       Reviewed by Brittany Dodson MD (Physician) on 02/18/25 at 1357      Medication Order Taking? Sig Documenting Provider Last Dose Status   amLODIPine (Norvasc) 5 mg tablet [id number] Yes Take 1 tablet (5 mg) by mouth once daily. Bronson Arcos MD  Active   aspirin 81 mg EC tablet [id number] Yes TAKE 1 TABLET (81 MG) BY MOUTH ONCE DAILY. DELAYED RELEASE Bronson Arcos MD  Active   cholecalciferol (Vitamin D3) 25 MCG (1000 UT) tablet [id number] Yes Take 1 tablet (1,000 Units) by mouth once daily. Brenden Pond MD  Active   diclofenac sodium 1 % kit [id number]  Apply topically once daily. APPLY SPARINGLY TO AFFECTED AREA(S) Historical Provider, MD  Active   folic acid (Folvite) 1 mg tablet [id number] Yes Take 1 tablet (1 mg) by mouth once daily. Brenden Pond MD  Active   lidocaine (Lidoderm) 5 % patch [id number] Yes 1 patch once daily. APPLY 1 PATCH TO THE AFFECTED AREA AND LEAVE IN PLACE FOR 12 HOURS, THEN REMOVE AND LEAVE OFF FOR 12 HOURS. Historical Provider, MD  Active   meloxicam (Mobic) 15 mg tablet [id number] Yes Take 1 tablet (15 mg) by mouth once  "daily. Franci Mckenna PA-C  Active   naltrexone (Depade) 50 mg tablet [id number] Yes Take 1 tablet (50 mg) by mouth once daily. Brenden Pond MD  Active   naproxen (Naprosyn) 500 mg tablet [id number] Yes Take 1 tablet (500 mg) by mouth every 12 hours. Take with food. Nicho Hernandez MD  Active   nicotine (Nicoderm CQ) 14 mg/24 hr patch [id number]  Place 1 patch over 24 hours on the skin once every 24 hours for 14 days. USE THIS DOSE AFTER THE 21 MG DOSE IS COMPLETE. PLACE ONE PATCH DAILY FOR 2 WEEKS. Brenden Pond MD   24   nicotine (Nicoderm CQ) 21 mg/24 hr patch [id number]  Place 1 patch over 24 hours on the skin once daily. START WITH THE 21 mg PATCH. PLACE ONE PATCH DAILY FOR 6 WEEKS. THEN MOVE ON TO NEXT PATCH DOSE. Brenden Pond MD   24   nicotine (Nicoderm CQ) 7 mg/24 hr patch [id number]  Place 1 patch over 24 hours on the skin once every 24 hours for 14 days. USE THIS DOSE AFTER THE 14 MG DOSE IS COMPLETE. PLACE ONE ON SKIN DAILY Brenden Pond MD   24   NON FORMULARY [id number] Yes 1 each once daily. VITAMIN D 50 MCG (2000) Oral Capsule Historical MD David  Active   thiamine 100 mg tablet [id number] Yes TAKE 1 TABLET BY MOUTH EVERY DAY Bronson Arcos MD  Active                    No Known Allergies    Social History     Socioeconomic History    Marital status:      Spouse name: Not on file    Number of children: Not on file    Years of education: Not on file    Highest education level: Not on file   Occupational History    Not on file   Tobacco Use    Smoking status: Every Day     Types: Cigarettes    Smokeless tobacco: Never   Substance and Sexual Activity    Alcohol use: Yes     Comment: beers weekday  and tequilla weekends    Drug use: Yes     Types: Marijuana, \"Crack\" cocaine    Sexual activity: Not on file   Other Topics Concern    Not on file   Social History Narrative    Not on file     Social Drivers of " Health     Financial Resource Strain: Not on file   Food Insecurity: Not on file   Transportation Needs: Not on file   Physical Activity: Not on file   Stress: Not on file   Social Connections: Not on file   Intimate Partner Violence: Not on file   Housing Stability: Not on file       No past surgical history on file.    XR foot right 3+ views    Result Date: 11/6/2024  Interpreted By:  Enrique Camarillo, STUDY: Right foot dated  11/5/2024.   INDICATION: Signs/Symptoms:s/p fracture   COMPARISON: None.   ACCESSION NUMBER(S): [id number]   ORDERING CLINICIAN: MARTY FROST   TECHNIQUE: Three views of the  right foot.   FINDINGS: No fracture or dislocation is evident.    No ankle joint effusion is evident. No soft tissue gas or radiopaque foreign body is evident.       No osseous injury or radiopaque foreign body is evident.   MACRO: None   Signed by: Enrique Camarillo 11/6/2024 12:41 PM Dictation workstation:   [id number]                             Scribe Attestation  By signing my name below, IRadha, Scribdina   attest that this documentation has been prepared under the direction and in the presence of Feliciano Ken MD.

## 2025-02-18 NOTE — ASSESSMENT & PLAN NOTE
- bp CONTROLLED on CA Blocker -refilled today  - denies h/o MI/stroke   Orders:    amLODIPine (Norvasc) 5 mg tablet; Take 1 tablet (5 mg) by mouth once daily in the morning.    Comprehensive Metabolic Panel; Future

## 2025-02-18 NOTE — ASSESSMENT & PLAN NOTE
- chronic, taking daily vit D 1000 IU   Orders:    Vitamin D 25-Hydroxy,Total (for eval of Vitamin D levels); Future

## 2025-02-19 LAB
25(OH)D3+25(OH)D2 SERPL-MCNC: 50 NG/ML (ref 30–100)
ALBUMIN SERPL-MCNC: 3.7 G/DL (ref 3.6–5.1)
ALP SERPL-CCNC: 61 U/L (ref 35–144)
ALT SERPL-CCNC: 8 U/L (ref 9–46)
ANION GAP SERPL CALCULATED.4IONS-SCNC: 7 MMOL/L (CALC) (ref 7–17)
AST SERPL-CCNC: 15 U/L (ref 10–35)
BILIRUB SERPL-MCNC: 0.2 MG/DL (ref 0.2–1.2)
BUN SERPL-MCNC: 16 MG/DL (ref 7–25)
CALCIUM SERPL-MCNC: 9.8 MG/DL (ref 8.6–10.3)
CHLORIDE SERPL-SCNC: 99 MMOL/L (ref 98–110)
CHOLEST SERPL-MCNC: 192 MG/DL
CHOLEST/HDLC SERPL: 3.9 (CALC)
CO2 SERPL-SCNC: 27 MMOL/L (ref 20–32)
CREAT SERPL-MCNC: 1.05 MG/DL (ref 0.7–1.3)
EGFRCR SERPLBLD CKD-EPI 2021: 83 ML/MIN/1.73M2
EST. AVERAGE GLUCOSE BLD GHB EST-MCNC: 126 MG/DL
EST. AVERAGE GLUCOSE BLD GHB EST-SCNC: 7 MMOL/L
GLUCOSE SERPL-MCNC: 83 MG/DL (ref 65–139)
HBA1C MFR BLD: 6 % OF TOTAL HGB
HDLC SERPL-MCNC: 49 MG/DL
LDLC SERPL CALC-MCNC: 112 MG/DL (CALC)
MAGNESIUM SERPL-MCNC: 2 MG/DL (ref 1.5–2.5)
NONHDLC SERPL-MCNC: 143 MG/DL (CALC)
POTASSIUM SERPL-SCNC: 4.9 MMOL/L (ref 3.5–5.3)
PROT SERPL-MCNC: 7.4 G/DL (ref 6.1–8.1)
SODIUM SERPL-SCNC: 133 MMOL/L (ref 135–146)
TRIGL SERPL-MCNC: 192 MG/DL
TSH SERPL-ACNC: 0.85 MIU/L (ref 0.4–4.5)

## 2025-02-20 ENCOUNTER — HOSPITAL ENCOUNTER (OUTPATIENT)
Dept: RADIOLOGY | Facility: CLINIC | Age: 55
Discharge: HOME | End: 2025-02-20
Payer: COMMERCIAL

## 2025-02-20 ENCOUNTER — OFFICE VISIT (OUTPATIENT)
Dept: ORTHOPEDIC SURGERY | Facility: CLINIC | Age: 55
End: 2025-02-20
Payer: COMMERCIAL

## 2025-02-20 DIAGNOSIS — M25.551 RIGHT HIP PAIN: ICD-10-CM

## 2025-02-20 DIAGNOSIS — M19.011 PRIMARY OSTEOARTHRITIS OF RIGHT SHOULDER: Primary | ICD-10-CM

## 2025-02-20 DIAGNOSIS — M16.11 PRIMARY OSTEOARTHRITIS OF RIGHT HIP: ICD-10-CM

## 2025-02-20 PROCEDURE — 99214 OFFICE O/P EST MOD 30 MIN: CPT | Performed by: ORTHOPAEDIC SURGERY

## 2025-02-20 PROCEDURE — 20610 DRAIN/INJ JOINT/BURSA W/O US: CPT | Mod: RT | Performed by: ORTHOPAEDIC SURGERY

## 2025-02-20 PROCEDURE — 2500000004 HC RX 250 GENERAL PHARMACY W/ HCPCS (ALT 636 FOR OP/ED): Performed by: ORTHOPAEDIC SURGERY

## 2025-02-20 PROCEDURE — 73502 X-RAY EXAM HIP UNI 2-3 VIEWS: CPT | Mod: RT

## 2025-02-20 RX ADMIN — LIDOCAINE HYDROCHLORIDE 8 ML: 10 INJECTION, SOLUTION INFILTRATION; PERINEURAL at 14:30

## 2025-02-20 RX ADMIN — TRIAMCINOLONE ACETONIDE 40 MG: 40 INJECTION, SUSPENSION INTRA-ARTICULAR; INTRAMUSCULAR at 14:30

## 2025-02-20 NOTE — LETTER
February 20, 2025     Patient: Cheko Kimble   YOB: 1970   Date of Visit: 2/20/2025       To Whom it May Concern:    Cheko Kimble was seen in my clinic on 2/20/2025.     If you have any questions or concerns, please don't hesitate to call.         Sincerely,          Feliciano Ken MD

## 2025-02-21 RX ORDER — LIDOCAINE HYDROCHLORIDE 10 MG/ML
8 INJECTION, SOLUTION INFILTRATION; PERINEURAL
Status: COMPLETED | OUTPATIENT
Start: 2025-02-20 | End: 2025-02-20

## 2025-02-21 RX ORDER — TRIAMCINOLONE ACETONIDE 40 MG/ML
40 INJECTION, SUSPENSION INTRA-ARTICULAR; INTRAMUSCULAR
Status: COMPLETED | OUTPATIENT
Start: 2025-02-20 | End: 2025-02-20

## 2025-02-26 ENCOUNTER — TELEPHONE (OUTPATIENT)
Dept: ORTHOPEDIC SURGERY | Facility: CLINIC | Age: 55
End: 2025-02-26
Payer: COMMERCIAL

## 2025-02-26 NOTE — TELEPHONE ENCOUNTER
called about a medication that was not called to pharmacy. I looked at note and had medication called into pharmacy.

## 2025-03-27 ENCOUNTER — APPOINTMENT (OUTPATIENT)
Dept: ORTHOPEDIC SURGERY | Facility: CLINIC | Age: 55
End: 2025-03-27
Payer: COMMERCIAL

## 2025-04-01 DIAGNOSIS — F10.90 ALCOHOL USE DISORDER: ICD-10-CM

## 2025-04-01 RX ORDER — LANOLIN ALCOHOL/MO/W.PET/CERES
100 CREAM (GRAM) TOPICAL DAILY
Qty: 90 TABLET | Refills: 1 | Status: SHIPPED | OUTPATIENT
Start: 2025-04-01

## 2025-04-10 ENCOUNTER — APPOINTMENT (OUTPATIENT)
Dept: ORTHOPEDIC SURGERY | Facility: CLINIC | Age: 55
End: 2025-04-10
Payer: COMMERCIAL

## 2025-05-15 ENCOUNTER — APPOINTMENT (OUTPATIENT)
Dept: ORTHOPEDIC SURGERY | Facility: CLINIC | Age: 55
End: 2025-05-15
Payer: COMMERCIAL

## 2025-06-05 NOTE — PROGRESS NOTES
History of Present Illness  No chief complaint on file.      Patient presents with {side:95153} hip pain for several years.  It has been worsening over the past  {months:86947} months.  The patient localizes the pain predominantly in the {hip location:08392}.  Recently there has been concern for falls and instability.  There is increasing difficulty with activities of daily living and significant disability related to the hip pain.  The patient endorses the following failed non-operative treatments: {treatment options:56067}.   There is increasing frustration with persistent pain and discomfort and decreasing distance of ambulation.    Pain is described as {pain description:27590}.  Better with rest, worse with activity.   Pain level: {PAIN SCALE NUMBERS:79529}  Assistive device: {amb assistive device:90707}  History of surgery on the hip: ***  Back pain: {YES (DEF)/NO:54275}  Numbness or tingling radiating to the lower extremities: {YES (DEF)/NO:08788}    Medical History[1]    Medication Documentation Review Audit       Reviewed by Brittany Dodson MD (Physician) on 02/18/25 at 1357      Medication Order Taking? Sig Documenting Provider Last Dose Status   amLODIPine (Norvasc) 5 mg tablet [id number] Yes Take 1 tablet (5 mg) by mouth once daily. Bronson Arcos MD  Active   aspirin 81 mg EC tablet [id number] Yes TAKE 1 TABLET (81 MG) BY MOUTH ONCE DAILY. DELAYED RELEASE Bronson Arcos MD  Active   cholecalciferol (Vitamin D3) 25 MCG (1000 UT) tablet [id number] Yes Take 1 tablet (1,000 Units) by mouth once daily. Brenden Pond MD  Active   diclofenac sodium 1 % kit [id number]  Apply topically once daily. APPLY SPARINGLY TO AFFECTED AREA(S) Historical Provider, MD  Active   folic acid (Folvite) 1 mg tablet [id number] Yes Take 1 tablet (1 mg) by mouth once daily. Brenden Pond MD  Active   lidocaine (Lidoderm) 5 % patch [id number] Yes 1 patch once daily. APPLY 1 PATCH TO THE AFFECTED AREA AND LEAVE IN PLACE FOR 12  HOURS, THEN REMOVE AND LEAVE OFF FOR 12 HOURS. Historical Provider, MD  Active   meloxicam (Mobic) 15 mg tablet [id number] Yes Take 1 tablet (15 mg) by mouth once daily. Franci Mckenna PA-C  Active   naltrexone (Depade) 50 mg tablet [id number] Yes Take 1 tablet (50 mg) by mouth once daily. Brenden Pond MD  Active   naproxen (Naprosyn) 500 mg tablet [id number] Yes Take 1 tablet (500 mg) by mouth every 12 hours. Take with food. Historical Provider, MD  Active   nicotine (Nicoderm CQ) 14 mg/24 hr patch [id number]  Place 1 patch over 24 hours on the skin once every 24 hours for 14 days. USE THIS DOSE AFTER THE 21 MG DOSE IS COMPLETE. PLACE ONE PATCH DAILY FOR 2 WEEKS. Brenden Pond MD   24   nicotine (Nicoderm CQ) 21 mg/24 hr patch [id number]  Place 1 patch over 24 hours on the skin once daily. START WITH THE 21 mg PATCH. PLACE ONE PATCH DAILY FOR 6 WEEKS. THEN MOVE ON TO NEXT PATCH DOSE. Brenden Pond MD   24 235   nicotine (Nicoderm CQ) 7 mg/24 hr patch [id number]  Place 1 patch over 24 hours on the skin once every 24 hours for 14 days. USE THIS DOSE AFTER THE 14 MG DOSE IS COMPLETE. PLACE ONE ON SKIN DAILY Brenden Pond MD   24 235   NON FORMULARY [id number] Yes 1 each once daily. VITAMIN D 50 MCG (2000) Oral Capsule Historical Provider, MD  Active   thiamine 100 mg tablet [id number] Yes TAKE 1 TABLET BY MOUTH EVERY DAY Bronson Arcos MD  Active                    RX Allergies[2]    Social History     Socioeconomic History    Marital status:      Spouse name: Not on file    Number of children: Not on file    Years of education: Not on file    Highest education level: Not on file   Occupational History    Not on file   Tobacco Use    Smoking status: Every Day     Types: Cigarettes    Smokeless tobacco: Never   Substance and Sexual Activity    Alcohol use: Yes     Comment: beers weekday  and tequilla weekends    Drug use: Yes     Types:  "Marijuana, \"Crack\" cocaine    Sexual activity: Not on file   Other Topics Concern    Not on file   Social History Narrative    Not on file     Social Drivers of Health     Financial Resource Strain: Not on file   Food Insecurity: Not on file   Transportation Needs: Not on file   Physical Activity: Not on file   Stress: Not on file   Social Connections: Not on file   Intimate Partner Violence: Not on file   Housing Stability: Not on file       Surgical History[3]    No images are attached to the encounter.    BMI  ***       Review of Systems   GENERAL: Negative for malaise, significant weight loss, fever  MUSCULOSKELETAL: see HPI  NEURO:  Negative     Exam  {side:39881} Hip:  Antalgic gait  Negative Trendelenburg sign  Skin healthy and intact  No tenderness to palpation over lumbar spine  Minimal tenderness over greater trochanter     Range of motion:  Flexion: {hip flexion:10689} internal rotation: {hip internal rotation:02045} external rotation: {hip external rotation:93685} abduction: {hip abduction:84030}  Pain with: {HIP EXAM POSITIVES:84296}  No weakness with resisted hip flexion, abduction or adduction     Negative straight leg raise  Neurovascular exam normal distally     Radiographs  XR hip right with pelvis when performed 2 or 3 views  Narrative: Interpreted By:  Julius Shen,   STUDY:  XR HIP RIGHT WITH PELVIS WHEN PERFORMED 2 OR 3 VIEWS; 2/20/2025 2:11  pm      INDICATION:  Signs/Symptoms:pain.      COMPARISON:  None.      ACCESSION NUMBER(S):  [id number]      ORDERING CLINICIAN:  BASILIO OLMOS      FINDINGS:  No acute fracture or dislocation. No lytic or blastic lesions or  periosteal reaction. Moderate to severe right hip joint space loss  with subchondral sclerosis, cystic changes and osteophytes. No  erosions.      Impression: No acute fracture or dislocation. Moderate degenerative changes of  the right hip.          Signed by: Julius Shen 2/21/2025 6:44 PM  Dictation workstation:   [id number]       "   Assessment  Osteoarthrosis {side:97992} hip     Plan  We discussed with the patient the diagnosis of {MILD, MOD, SEV:71521} degenerative joint disease of the hip.  We reviewed an evidence-based approach to osteoarthritis of the hip.  We strongly encouraged low-impact aerobic activity and non-opioid analgesics.  We discussed the role of physical therapy to aid in strengthening and gait training.  We discussed temporary pain relief with corticosteroid injections and the associated risks.      The patient elected for ***.                      [1]   Past Medical History:  Diagnosis Date    Other specified health status     No pertinent past surgical history    Unspecified injury of external genitals, initial encounter     Testicular injury    Urinary tract infection, site not specified     UTI (lower urinary tract infection)   [2] No Known Allergies  [3] No past surgical history on file.

## 2025-06-06 ENCOUNTER — APPOINTMENT (OUTPATIENT)
Dept: ORTHOPEDIC SURGERY | Facility: CLINIC | Age: 55
End: 2025-06-06
Payer: COMMERCIAL

## 2025-06-10 ENCOUNTER — APPOINTMENT (OUTPATIENT)
Dept: ORTHOPEDIC SURGERY | Facility: CLINIC | Age: 55
End: 2025-06-10
Payer: COMMERCIAL

## 2025-06-13 DIAGNOSIS — M25.511 RIGHT SHOULDER PAIN, UNSPECIFIED CHRONICITY: ICD-10-CM

## 2025-06-13 NOTE — PROGRESS NOTES
History of Present Illness  No chief complaint on file.      Patient presents with side: right hip pain for several years.  It has been worsening over the past  1 months.  The patient localizes the pain predominantly in the groin.  Recently there has been concern for falls and instability.  There is increasing difficulty with activities of daily living and significant disability related to the hip pain.  The patient endorses the following failed non-operative treatments: Rest, ice, elevation, Tylenol, and NSAIDS.   There is increasing frustration with persistent pain and discomfort and decreasing distance of ambulation.    He uses marijuana, crack cocaine and alcohol.  He states last time he used marijuana and crack cocaine was 2 weeks ago    Pain is described as aching, sore, stiff, and severe.  Better with rest, worse with activity.   Pain level: 10  Assistive device: no device  History of surgery on the hip: No  Back pain: No  Numbness or tingling radiating to the lower extremities: No    Medical History[1]    Medication Documentation Review Audit       Reviewed by Brittany Dodson MD (Physician) on 02/18/25 at 1357      Medication Order Taking? Sig Documenting Provider Last Dose Status   amLODIPine (Norvasc) 5 mg tablet [id number] Yes Take 1 tablet (5 mg) by mouth once daily. Bronson Arcos MD  Active   aspirin 81 mg EC tablet [id number] Yes TAKE 1 TABLET (81 MG) BY MOUTH ONCE DAILY. DELAYED RELEASE Bronson Arcos MD  Active   cholecalciferol (Vitamin D3) 25 MCG (1000 UT) tablet [id number] Yes Take 1 tablet (1,000 Units) by mouth once daily. Brenden Pond MD  Active   diclofenac sodium 1 % kit [id number]  Apply topically once daily. APPLY SPARINGLY TO AFFECTED AREA(S) Historical Provider, MD  Active   folic acid (Folvite) 1 mg tablet [id number] Yes Take 1 tablet (1 mg) by mouth once daily. Brenden Pond MD  Active   lidocaine (Lidoderm) 5 % patch [id number] Yes 1 patch once daily. APPLY 1 PATCH TO THE  AFFECTED AREA AND LEAVE IN PLACE FOR 12 HOURS, THEN REMOVE AND LEAVE OFF FOR 12 HOURS. Historical Provider, MD  Active   meloxicam (Mobic) 15 mg tablet [id number] Yes Take 1 tablet (15 mg) by mouth once daily. Franci Mckenna PA-C  Active   naltrexone (Depade) 50 mg tablet [id number] Yes Take 1 tablet (50 mg) by mouth once daily. Brenden Pond MD  Active   naproxen (Naprosyn) 500 mg tablet [id number] Yes Take 1 tablet (500 mg) by mouth every 12 hours. Take with food. Historical Provider, MD  Active   nicotine (Nicoderm CQ) 14 mg/24 hr patch [id number]  Place 1 patch over 24 hours on the skin once every 24 hours for 14 days. USE THIS DOSE AFTER THE 21 MG DOSE IS COMPLETE. PLACE ONE PATCH DAILY FOR 2 WEEKS. Brenden Pond MD   24 235   nicotine (Nicoderm CQ) 21 mg/24 hr patch [id number]  Place 1 patch over 24 hours on the skin once daily. START WITH THE 21 mg PATCH. PLACE ONE PATCH DAILY FOR 6 WEEKS. THEN MOVE ON TO NEXT PATCH DOSE. Brenden Pond MD   24 235   nicotine (Nicoderm CQ) 7 mg/24 hr patch [id number]  Place 1 patch over 24 hours on the skin once every 24 hours for 14 days. USE THIS DOSE AFTER THE 14 MG DOSE IS COMPLETE. PLACE ONE ON SKIN DAILY Brenden Pond MD   24 2359   NON FORMULARY [id number] Yes 1 each once daily. VITAMIN D 50 MCG ( [place]) Oral Capsule Historical Provider, MD  Active   thiamine 100 mg tablet [id number] Yes TAKE 1 TABLET BY MOUTH EVERY DAY Bronson Arcos MD  Active                    RX Allergies[2]    Social History     Socioeconomic History    Marital status:      Spouse name: Not on file    Number of children: Not on file    Years of education: Not on file    Highest education level: Not on file   Occupational History    Not on file   Tobacco Use    Smoking status: Every Day     Types: Cigarettes    Smokeless tobacco: Never   Substance and Sexual Activity    Alcohol use: Yes     Comment: beers weekday  and tequilla  "weekends    Drug use: Yes     Types: Marijuana, \"Crack\" cocaine    Sexual activity: Not on file   Other Topics Concern    Not on file   Social History Narrative    Not on file     Social Drivers of Health     Financial Resource Strain: Not on file   Food Insecurity: Not on file   Transportation Needs: Not on file   Physical Activity: Not on file   Stress: Not on file   Social Connections: Not on file   Intimate Partner Violence: Not on file   Housing Stability: Not on file       Surgical History[3]    No images are attached to the encounter.    BMI  23       Review of Systems   GENERAL: Negative for malaise, significant weight loss, fever  MUSCULOSKELETAL: see HPI  NEURO:  Negative     Exam  side: right Hip:  Antalgic gait  Negative Trendelenburg sign  Skin healthy and intact  No tenderness to palpation over lumbar spine  Minimal tenderness over greater trochanter     Range of motion:  Flexion: 110 internal rotation: 10 external rotation: 10 abduction: 20  Pain with: decreased ROM  pain with movement of hip  pain with flexion  pain with rotation  No weakness with resisted hip flexion, abduction or adduction     Negative straight leg raise  Neurovascular exam normal distally     Radiographs  XR hip right with pelvis when performed 2 or 3 views  Narrative: Interpreted By:  Julius Shen,   STUDY:  XR HIP RIGHT WITH PELVIS WHEN PERFORMED 2 OR 3 VIEWS; 2/20/2025 2:11  pm      INDICATION:  Signs/Symptoms:pain.      COMPARISON:  None.      ACCESSION NUMBER(S):  [id number]      ORDERING CLINICIAN:  BASILIO OLMOS      FINDINGS:  No acute fracture or dislocation. No lytic or blastic lesions or  periosteal reaction. Moderate to severe right hip joint space loss  with subchondral sclerosis, cystic changes and osteophytes. No  erosions.      Impression: No acute fracture or dislocation. Moderate degenerative changes of  the right hip.          Signed by: Julius Shen 2/21/2025 6:44 PM  Dictation workstation:   [id number]       "   Assessment  Osteoarthrosis side: right hip  Polysubstance abuse     Plan  He has end-stage right hip osteoarthritis however his polysubstance abuse will prevent him from having joint arthroplasty surgery.  We will set him up for a corticosteroid injection into his right hip.  Hopefully during the time for the injection he can stay clean.  And we will test him before any future joint arthroplasty if we get to that point.  Follow-up 6 weeks after the intra-articular corticosteroid injection  Discussed smoking cessation as well  All questions answered.                        [1]   Past Medical History:  Diagnosis Date    Other specified health status     No pertinent past surgical history    Unspecified injury of external genitals, initial encounter     Testicular injury    Urinary tract infection, site not specified     UTI (lower urinary tract infection)   [2] No Known Allergies  [3] No past surgical history on file.

## 2025-06-16 ENCOUNTER — OFFICE VISIT (OUTPATIENT)
Dept: ORTHOPEDIC SURGERY | Facility: CLINIC | Age: 55
End: 2025-06-16
Payer: COMMERCIAL

## 2025-06-16 VITALS — WEIGHT: 150 LBS | HEIGHT: 67 IN | BODY MASS INDEX: 23.54 KG/M2

## 2025-06-16 DIAGNOSIS — M16.11 PRIMARY OSTEOARTHRITIS OF RIGHT HIP: Primary | ICD-10-CM

## 2025-06-16 DIAGNOSIS — F19.10 POLYSUBSTANCE ABUSE: ICD-10-CM

## 2025-06-16 PROCEDURE — 99202 OFFICE O/P NEW SF 15 MIN: CPT | Performed by: ORTHOPAEDIC SURGERY

## 2025-06-16 PROCEDURE — 3008F BODY MASS INDEX DOCD: CPT | Performed by: ORTHOPAEDIC SURGERY

## 2025-06-16 PROCEDURE — 99214 OFFICE O/P EST MOD 30 MIN: CPT | Performed by: ORTHOPAEDIC SURGERY

## 2025-06-16 RX ORDER — MELOXICAM 15 MG/1
15 TABLET ORAL DAILY
Qty: 30 TABLET | Refills: 2 | OUTPATIENT
Start: 2025-06-16

## 2025-06-17 ENCOUNTER — TELEPHONE (OUTPATIENT)
Dept: ORTHOPEDIC SURGERY | Facility: CLINIC | Age: 55
End: 2025-06-17
Payer: COMMERCIAL

## 2025-06-17 DIAGNOSIS — M25.511 RIGHT SHOULDER PAIN, UNSPECIFIED CHRONICITY: ICD-10-CM

## 2025-06-17 RX ORDER — MELOXICAM 15 MG/1
15 TABLET ORAL DAILY
Qty: 30 TABLET | Refills: 2 | Status: SHIPPED | OUTPATIENT
Start: 2025-06-17 | End: 2026-06-17

## 2025-07-07 NOTE — PROGRESS NOTES
No chief complaint on file.    History of Present Illness:  7/8/2025: We gave him a cortisone injection into the right shoulder on 2/20/25.      2/20/25: We gave him a cortisone injection into the right shoulder on 11/21/24. He got about 3 months of relief with the cortisone injection and would like to repeat that today. He does have a new complaint of right hip pain.     11/21/24: Cheko Kimble is a 55 y.o. male presenting to clinic for his right shoulder. He was seen in the ED for this on 10/03/24. He denies any injury or fall in the shoulder. His pain has been ongoing for 4 months. He just finished oral steroids and mobic that did seem to help. He has not had any injections in this shoulder.     Imaging:  X-rays right shoulder: Shows no acute fractures or dislocations. Shows severe arthritis.   X-rays right hip: Shows severe right hip arthritis      Assessment:   Severe right shoulder arthritis  Severe right hip arthritis    Plan:  We reviewed the role of imaging, physical therapy, injections and the time frame to healing and correlation with outcome.  Right shoulder cortisone injection today.   NSAID: Mobic. GI side effects and medical risks discussed  Ice: 60 minutes on and off  Exercise home program: Medically directed Shoulder therapy / Handout given  Referral to Dr. Herrera to establish care for potential hip replacement.   Follow-up as needed for repeat injections in the shoulder.     Physical Exam:  Well-nourished, well-developed. No acute distress. Alert and oriented x3. Responds appropriately to questioning. Good mood. Normal affect.  Right Shoulder:  Strength / ROM: Externally rotates to 30, abduction to 90, 5/5 rotator cuff strength   Speeds test + impingement  Positive Neer and Hawkin´s test  Neurovascular exam normal distally      Review of Systems:  GENERAL: Negative for malaise, significant weight loss, fever  MUSCULOSKELETAL: See HPI  NEURO:  Negative     Past Medical History:   Diagnosis Date     Other specified health status     No pertinent past surgical history    Unspecified injury of external genitals, initial encounter     Testicular injury    Urinary tract infection, site not specified     UTI (lower urinary tract infection)       Medication Documentation Review Audit       Reviewed by Aysha Gomez MA (Medical Assistant) on 25 at 1326      Medication Order Taking? Sig Documenting Provider Last Dose Status   amLODIPine (Norvasc) 5 mg tablet [id number]  Take 1 tablet (5 mg) by mouth once daily in the morning. Brittany Dodson MD  Active   aspirin 81 mg EC tablet [id number]  Take 1 tablet (81 mg) by mouth once daily. Delayed Release Brittany Dodson MD  Active   cholecalciferol (Vitamin D3) 25 MCG (1000 UT) tablet [id number]  Take 1 tablet (1,000 Units) by mouth once daily. Brenden Pond MD  Active   diclofenac sodium 1 % kit [id number] No Apply topically once daily. APPLY SPARINGLY TO AFFECTED AREA(S) Historical Provider, MD Taking Active   lidocaine (Lidoderm) 5 % patch [id number] No 1 patch once daily. APPLY 1 PATCH TO THE AFFECTED AREA AND LEAVE IN PLACE FOR 12 HOURS, THEN REMOVE AND LEAVE OFF FOR 12 HOURS. Historical Provider, MD Taking Active   meloxicam (Mobic) 15 mg tablet [id number]  Take 1 tablet (15 mg) by mouth once daily. Franci Mckenna PA-C  Active   naproxen (Naprosyn) 500 mg tablet [id number] No Take 1 tablet (500 mg) by mouth every 12 hours. Take with food. Historical Provider, MD Taking Active   nicotine (Nicoderm CQ) 14 mg/24 hr patch [id number]  Place 1 patch over 24 hours on the skin once every 24 hours for 14 days. USE THIS DOSE AFTER THE 21 MG DOSE IS COMPLETE. PLACE ONE PATCH DAILY FOR 2 WEEKS. Brenden Pond MD   24 3419   nicotine (Nicoderm CQ) 21 mg/24 hr patch [id number]  Place 1 patch over 24 hours on the skin once daily. START WITH THE 21 mg PATCH. PLACE ONE PATCH DAILY FOR 6 WEEKS. THEN MOVE ON TO NEXT PATCH DOSE. Brenden Pond MD    "24 2359   nicotine (Nicoderm CQ) 7 mg/24 hr patch [id number]  Place 1 patch over 24 hours on the skin once every 24 hours for 14 days. USE THIS DOSE AFTER THE 14 MG DOSE IS COMPLETE. PLACE ONE ON SKIN DAILY Brenden Pond MD   24 235   thiamine 100 mg tablet [id number]  TAKE 1 TABLET BY MOUTH EVERY DAY Brittany Dodson MD  Active                    No Known Allergies    Social History     Socioeconomic History    Marital status:      Spouse name: Not on file    Number of children: Not on file    Years of education: Not on file    Highest education level: Not on file   Occupational History    Not on file   Tobacco Use    Smoking status: Every Day     Types: Cigarettes    Smokeless tobacco: Never   Substance and Sexual Activity    Alcohol use: Yes     Comment: beers weekday  and tequilla weekends    Drug use: Yes     Types: Marijuana, \"Crack\" cocaine    Sexual activity: Not on file   Other Topics Concern    Not on file   Social History Narrative    Not on file     Social Drivers of Health     Financial Resource Strain: Not on file   Food Insecurity: Not on file   Transportation Needs: Not on file   Physical Activity: Not on file   Stress: Not on file   Social Connections: Not on file   Intimate Partner Violence: Not on file   Housing Stability: Not on file       No past surgical history on file.    XR foot right 3+ views    Result Date: 2024  Interpreted By:  Enrique Camarillo, STUDY: Right foot dated  2024.   INDICATION: Signs/Symptoms:s/p fracture   COMPARISON: None.   ACCESSION NUMBER(S): [id number]   ORDERING CLINICIAN: MARTY FROST   TECHNIQUE: Three views of the  right foot.   FINDINGS: No fracture or dislocation is evident.    No ankle joint effusion is evident. No soft tissue gas or radiopaque foreign body is evident.       No osseous injury or radiopaque foreign body is evident.   MACRO: None   Signed by: Enrique Camarillo 2024 12:41 PM Dictation workstation:   " [id number]                             Scribe Attestation  By signing my name below, IRadha Scribe   attest that this documentation has been prepared under the direction and in the presence of Feliciano Ken MD.

## 2025-07-08 ENCOUNTER — TELEPHONE (OUTPATIENT)
Dept: ORTHOPEDIC SURGERY | Facility: CLINIC | Age: 55
End: 2025-07-08
Payer: COMMERCIAL

## 2025-07-08 ENCOUNTER — APPOINTMENT (OUTPATIENT)
Dept: ORTHOPEDIC SURGERY | Facility: CLINIC | Age: 55
End: 2025-07-08
Payer: COMMERCIAL

## 2025-07-08 NOTE — TELEPHONE ENCOUNTER
----- Message from Yee LEVIN sent at 6/16/2025  1:47 PM EDT -----  Precert RIGHT hip USG CSI with Dr. Budinsky Dr. Sabo referring and wants to see him six weeks after injection     Thank you !

## 2025-07-08 NOTE — TELEPHONE ENCOUNTER
I have been trying to reach Pt since 6/17/25. His phone gives a fast busy signal. I have also been trying to reach his wife (emergency contact) since 6/17/25, and her phone isn't accepting calls.

## 2025-07-22 NOTE — PROGRESS NOTES
No chief complaint on file.    History of Present Illness:  7/24/2025: We gave him a cortisone injection into the right shoulder on 2/20/25.      2/20/25: We gave him a cortisone injection into the right shoulder on 11/21/24. He got about 3 months of relief with the cortisone injection and would like to repeat that today. He does have a new complaint of right hip pain.     11/21/24: Cheko Kimble is a 55 y.o. male presenting to clinic for his right shoulder. He was seen in the ED for this on 10/03/24. He denies any injury or fall in the shoulder. His pain has been ongoing for 4 months. He just finished oral steroids and mobic that did seem to help. He has not had any injections in this shoulder.     Imaging:  X-rays right shoulder: Shows no acute fractures or dislocations. Shows severe arthritis.   X-rays right hip: Shows severe right hip arthritis      Assessment:   Severe right shoulder arthritis  Severe right hip arthritis    Plan:  We reviewed the role of imaging, physical therapy, injections and the time frame to healing and correlation with outcome.  Right shoulder cortisone injection today.   NSAID: Mobic. GI side effects and medical risks discussed  Ice: 60 minutes on and off  Exercise home program: Medically directed Shoulder therapy / Handout given  Referral to Dr. Herrera to establish care for potential hip replacement.   Follow-up as needed for repeat injections in the shoulder.     Physical Exam:  Well-nourished, well-developed. No acute distress. Alert and oriented x3. Responds appropriately to questioning. Good mood. Normal affect.  Right Shoulder:  Strength / ROM: Externally rotates to 30, abduction to 90, 5/5 rotator cuff strength   Speeds test + impingement  Positive Neer and Hawkin´s test  Neurovascular exam normal distally      Review of Systems:  GENERAL: Negative for malaise, significant weight loss, fever  MUSCULOSKELETAL: See HPI  NEURO:  Negative     Past Medical History:   Diagnosis Date     Other specified health status     No pertinent past surgical history    Unspecified injury of external genitals, initial encounter     Testicular injury    Urinary tract infection, site not specified     UTI (lower urinary tract infection)       Medication Documentation Review Audit       Reviewed by Aysha Gomez MA (Medical Assistant) on 25 at 1326      Medication Order Taking? Sig Documenting Provider Last Dose Status   amLODIPine (Norvasc) 5 mg tablet [id number]  Take 1 tablet (5 mg) by mouth once daily in the morning. Brittany Dodson MD  Active   aspirin 81 mg EC tablet [id number]  Take 1 tablet (81 mg) by mouth once daily. Delayed Release Brittany Dodson MD  Active   cholecalciferol (Vitamin D3) 25 MCG (1000 UT) tablet [id number]  Take 1 tablet (1,000 Units) by mouth once daily. Brenden Pond MD  Active   diclofenac sodium 1 % kit [id number] No Apply topically once daily. APPLY SPARINGLY TO AFFECTED AREA(S) Historical Provider, MD Taking Active   lidocaine (Lidoderm) 5 % patch [id number] No 1 patch once daily. APPLY 1 PATCH TO THE AFFECTED AREA AND LEAVE IN PLACE FOR 12 HOURS, THEN REMOVE AND LEAVE OFF FOR 12 HOURS. Historical Provider, MD Taking Active   meloxicam (Mobic) 15 mg tablet [id number]  Take 1 tablet (15 mg) by mouth once daily. Franci Mckenna PA-C  Active   naproxen (Naprosyn) 500 mg tablet [id number] No Take 1 tablet (500 mg) by mouth every 12 hours. Take with food. Historical Provider, MD Taking Active   nicotine (Nicoderm CQ) 14 mg/24 hr patch [id number]  Place 1 patch over 24 hours on the skin once every 24 hours for 14 days. USE THIS DOSE AFTER THE 21 MG DOSE IS COMPLETE. PLACE ONE PATCH DAILY FOR 2 WEEKS. Brenden Pond MD   24 8509   nicotine (Nicoderm CQ) 21 mg/24 hr patch [id number]  Place 1 patch over 24 hours on the skin once daily. START WITH THE 21 mg PATCH. PLACE ONE PATCH DAILY FOR 6 WEEKS. THEN MOVE ON TO NEXT PATCH DOSE. Brenden Pond MD    "24 2359   nicotine (Nicoderm CQ) 7 mg/24 hr patch [id number]  Place 1 patch over 24 hours on the skin once every 24 hours for 14 days. USE THIS DOSE AFTER THE 14 MG DOSE IS COMPLETE. PLACE ONE ON SKIN DAILY Brenden Pond MD   24 235   thiamine 100 mg tablet [id number]  TAKE 1 TABLET BY MOUTH EVERY DAY Brittany Dodson MD  Active                    No Known Allergies    Social History     Socioeconomic History    Marital status:      Spouse name: Not on file    Number of children: Not on file    Years of education: Not on file    Highest education level: Not on file   Occupational History    Not on file   Tobacco Use    Smoking status: Every Day     Types: Cigarettes    Smokeless tobacco: Never   Substance and Sexual Activity    Alcohol use: Yes     Comment: beers weekday  and tequilla weekends    Drug use: Yes     Types: Marijuana, \"Crack\" cocaine    Sexual activity: Not on file   Other Topics Concern    Not on file   Social History Narrative    Not on file     Social Drivers of Health     Financial Resource Strain: Not on file   Food Insecurity: Not on file   Transportation Needs: Not on file   Physical Activity: Not on file   Stress: Not on file   Social Connections: Not on file   Intimate Partner Violence: Not on file   Housing Stability: Not on file       No past surgical history on file.    XR foot right 3+ views    Result Date: 2024  Interpreted By:  Enrique Camarillo, STUDY: Right foot dated  2024.   INDICATION: Signs/Symptoms:s/p fracture   COMPARISON: None.   ACCESSION NUMBER(S): [id number]   ORDERING CLINICIAN: MARTY FROST   TECHNIQUE: Three views of the  right foot.   FINDINGS: No fracture or dislocation is evident.    No ankle joint effusion is evident. No soft tissue gas or radiopaque foreign body is evident.       No osseous injury or radiopaque foreign body is evident.   MACRO: None   Signed by: Enrique Camarillo 2024 12:41 PM Dictation workstation:   " [id number]      Scribe Attestation:  By signing my name below, I, Angel Luis Ruffin attest that this documentation has been prepared under the direction and in the presence of Feliciano Ken MD.    Provider Attestation - Scribe documentation:  All medical record entries made by Radha Rondon were at my direction and personally dictated by me, Feliciano Ken MD. I have reviewed the chart and agree that the record is accurate and I confirmed that it reflects my personal performance of the history, physical exam, discussion, and plan.

## 2025-07-24 ENCOUNTER — APPOINTMENT (OUTPATIENT)
Dept: ORTHOPEDIC SURGERY | Facility: CLINIC | Age: 55
End: 2025-07-24
Payer: COMMERCIAL

## 2025-08-15 ENCOUNTER — APPOINTMENT (OUTPATIENT)
Dept: ORTHOPEDIC SURGERY | Facility: CLINIC | Age: 55
End: 2025-08-15
Payer: COMMERCIAL

## 2025-08-18 ENCOUNTER — APPOINTMENT (OUTPATIENT)
Dept: PRIMARY CARE | Facility: CLINIC | Age: 55
End: 2025-08-18
Payer: COMMERCIAL